# Patient Record
(demographics unavailable — no encounter records)

---

## 2025-03-20 NOTE — REVIEW OF SYSTEMS
[Birthmarks] : birthmarks [Negative] : Musculoskeletal [FreeTextEntry8] : Seizure like episode  [de-identified] : Sternal hemangioma shrinking with medication (proranolol).

## 2025-03-20 NOTE — PHYSICAL EXAM
[Normocephalic] : normocephalic [No dysmorphic facial features] : no dysmorphic facial features [Soft] : soft [No deformities] : no deformities [Alert] : alert [Well related, good eye contact] : well related, good eye contact [Pupils reactive to light] : pupils reactive to light [Turns to light] : turns to light [Responds to touch on face] : responds to touch on face [No facial asymmetry or weakness] : no facial asymmetry or weakness [No nystagmus] : no nystagmus [Responds to voice/sounds] : responds to voice/sounds [Normal axial and appendicular muscle tone with symmetric limb movements] : normal axial and appendicular muscle tone with symmetric limb movements [No abnormal involuntary movements] : no abnormal involuntary movements [Stands alone] : stands alone [Walks well for age] : walks well for age [Knee jerks] : knee jerks [Ankle jerks] : ankle jerks [No ankle clonus] : no ankle clonus [Bilaterally] : bilaterally [de-identified] : 44 cm  20% [de-identified] : AF open. [de-identified] : Mid sternal hemangioma

## 2025-03-20 NOTE — PLAN
[FreeTextEntry1] : I asked the MOC to keep a log of the staring episodes. I advised to return in 2 months or sooner if the episodes change to be more frequent or more complex.

## 2025-03-20 NOTE — HISTORY OF PRESENT ILLNESS
[FreeTextEntry1] : 3/20/2024  with the mother. She reported freezing episodes x few seconds now occurring 3-4 times per week, lasting few seconds. On a home video the child is observed to sit quietly, not moving with eye open looking forward with no other associated features